# Patient Record
Sex: MALE | Race: WHITE
[De-identification: names, ages, dates, MRNs, and addresses within clinical notes are randomized per-mention and may not be internally consistent; named-entity substitution may affect disease eponyms.]

---

## 2018-12-15 ENCOUNTER — HOSPITAL ENCOUNTER (EMERGENCY)
Dept: HOSPITAL 97 - ER | Age: 23
Discharge: HOME | End: 2018-12-15
Payer: COMMERCIAL

## 2018-12-15 DIAGNOSIS — L02.211: Primary | ICD-10-CM

## 2018-12-15 DIAGNOSIS — F17.210: ICD-10-CM

## 2018-12-15 DIAGNOSIS — Z23: ICD-10-CM

## 2018-12-15 PROCEDURE — 90714 TD VACC NO PRESV 7 YRS+ IM: CPT

## 2018-12-15 PROCEDURE — 0J980ZZ DRAINAGE OF ABDOMEN SUBCUTANEOUS TISSUE AND FASCIA, OPEN APPROACH: ICD-10-PCS

## 2018-12-15 PROCEDURE — 96372 THER/PROPH/DIAG INJ SC/IM: CPT

## 2018-12-15 PROCEDURE — 99283 EMERGENCY DEPT VISIT LOW MDM: CPT

## 2018-12-15 NOTE — EDPHYS
Physician Documentation                                                                           

 [facility]                                                                

Name: Caesar Harvey                                                                                 

Age: 23 yrs                                                                                       

Sex: Male                                                                                         

: 1995                                                                                   

MRN: [id number]                                                                                   

Arrival Date: 12/15/2018                                                                          

Time: 16:57                                                                                       

Account#: [id number]                                                                            

Bed 18                                                                                            

Private MD:                                                                                       

ED Physician Mark Mcdowell                                                                       

HPI:                                                                                              

12/15                                                                                             

17:57 This 23 yrs old  Male presents to ER via Ambulatory with complaints of abscess snw 

      to right lower abdomen.                                                                     

17:57 The patient presents with an abscess of the right lower quadrant, The patient presents  snw 

      with cellulitis of the right lower quadrant, surrounding localized abscess.                 

      Description: The affected area is moderate sized, well demarcated, erythematous,            

      pointed. Onset: The symptoms/episode began/occurred suddenly, and became worse.             

      Possible cause(s): unknown. Associated signs and symptoms: Pertinent positives:             

      erythema. Severity of symptoms: At their worst the symptoms were moderate. The patient      

      has not experienced similar symptoms in the past. The patient has been recently seen by     

      a physician:. Mom gave pt a doxycycline and clindamycin this am.                            

                                                                                                  

Historical:                                                                                       

- Allergies:                                                                                      

17:30 No Known Allergies;                                                                     aj1 

- Home Meds:                                                                                      

17:30 None [Active];                                                                          aj1 

- PMHx:                                                                                           

17:30 None;                                                                                   aj1 

- PSHx:                                                                                           

17:30 None;                                                                                   aj1 

                                                                                                  

- Immunization history:: Flu vaccine is up to date.                                               

- Social history:: Smoking status: Patient uses tobacco products, smokes one pack                 

  cigarettes per day.                                                                             

- Ebola Screening: : Patient denies travel to an Ebola-affected area in the 21 days               

  before illness onset.                                                                           

                                                                                                  

                                                                                                  

ROS:                                                                                              

18:35 Constitutional: Negative for fever, chills, and weight loss, Eyes: Negative for injury, snw 

      pain, redness, and discharge, ENT: Negative for injury, pain, and discharge, Neck:          

      Negative for injury, pain, and swelling, Cardiovascular: Negative for chest pain,           

      palpitations, and edema, Respiratory: Negative for shortness of breath, cough,              

      wheezing, and pleuritic chest pain, Abdomen/GI: Negative for abdominal pain, nausea,        

      vomiting, diarrhea, and constipation, Back: Negative for injury and pain, : Negative      

      for injury, bleeding, discharge, and swelling, MS/Extremity: Negative for injury and        

      deformity, Neuro: Negative for headache, weakness, numbness, tingling, and seizure,         

      Psych: Negative for depression, anxiety, suicide ideation, homicidal ideation, and          

      hallucinations.                                                                             

18:35 Skin: Positive for abscess, cellulitis, of the right lower quadrant.                        

                                                                                                  

Exam:                                                                                             

17:53 Constitutional:  This is a well developed, well nourished patient who is awake, alert,  snw 

      and in no acute distress. Head/Face:  Normocephalic, atraumatic. Eyes:  Pupils equal        

      round and reactive to light, extra-ocular motions intact.  Lids and lashes normal.          

      Conjunctiva and sclera are non-icteric and not injected.  Cornea within normal limits.      

      Periorbital areas with no swelling, redness, or edema. ENT:  Nares patent. No nasal         

      discharge, no septal abnormalities noted.  Tympanic membranes are normal and external       

      auditory canals are clear.  Oropharynx with no redness, swelling, or masses, exudates,      

      or evidence of obstruction, uvula midline.  Mucous membranes moist. Neck:  Trachea          

      midline, no thyromegaly or masses palpated, and no cervical lymphadenopathy.  Supple,       

      full range of motion without nuchal rigidity, or vertebral point tenderness.  No            

      Meningismus. Chest/axilla:  Normal chest wall appearance and motion.  Nontender with no     

      deformity.  No lesions are appreciated. Cardiovascular:  Regular rate and rhythm with a     

      normal S1 and S2.  No gallops, murmurs, or rubs.  Normal PMI, no JVD.  No pulse             

      deficits. Respiratory:  Lungs have equal breath sounds bilaterally, clear to                

      auscultation and percussion.  No rales, rhonchi or wheezes noted.  No increased work of     

      breathing, no retractions or nasal flaring. Abdomen/GI:  Soft, non-tender, with normal      

      bowel sounds.  No distension or tympany.  No guarding or rebound.  No evidence of           

      tenderness throughout. Back:  No spinal tenderness.  No costovertebral tenderness.          

      Full range of motion. MS/ Extremity:  Pulses equal, no cyanosis.  Neurovascular intact.     

       Full, normal range of motion. Neuro:  Awake and alert, GCS 15, oriented to person,         

      place, time, and situation.  Cranial nerves II-XII grossly intact.  Motor strength 5/5      

      in all extremities.  Sensory grossly intact.  Cerebellar exam normal.  Normal gait.         

      Psych:  Awake, alert, with orientation to person, place and time.  Behavior, mood, and      

      affect are within normal limits.                                                            

17:53 Skin: Appearance: normal except for affected area, abscess, that is small, of the right     

      lower quadrant, with pointing, with surrounding cellulitis, that is moderate.               

                                                                                                  

Vital Signs:                                                                                      

17:30  / 83; Pulse 91; Resp 18; Temp 98.3; Pulse Ox 98% on R/A; Weight 88.45 kg (R);    aj1 

      Height 6 ft. 4 in. (193.04 cm) (R); Pain 8/10;                                              

17:30 Body Mass Index 23.74 (88.45 kg, 193.04 cm)                                             aj1 

                                                                                                  

Procedures:                                                                                       

17:53 I \T\ D: Incision and drainage was performed for an abscess of the right lower abdomen    snw

      Prepped with hibiclens. Incised with unroofed pustule with 18 gauge needle. Drained         

      large amount purulent fluid. the patient tolerated the procedure well.                      

18:31 Performed after pain medication administration, firm pressure applied to either side of snw 

      opening and large amt exudate expressed.                                                    

                                                                                                  

MDM:                                                                                              

17:43 Patient medically screened.                                                             snw 

17:53 Data reviewed: vital signs, nurses notes. Data interpreted: Pulse oximetry: on room air snw 

      is 98 %. Interpretation: normal. Counseling: I had a detailed discussion with the           

      patient and/or guardian regarding: the historical points, exam findings, and any            

      diagnostic results supporting the discharge/admit diagnosis, the presence of at least       

      one elevated blood pressure reading (>120/80) during this emergency department visit.       

                                                                                                  

12/15                                                                                             

18:35 Order name: Wound dressing; Complete Time: 18:38                                        snw 

                                                                                                  

Administered Medications:                                                                         

18:21 Drug: Tetanus-Diphtheria Toxoid Adult 0.5 ml {: [employer]. Exp:         em  

      2021. Lot #: [id number]. } Route: IM; Site: right deltoid;                                 

18:38 Follow up: Response: No adverse reaction                                                em  

18:22 Drug: TORadol 60 mg Route: IM; Site: right deltoid;                                     em  

18:38 Follow up: Response: No adverse reaction; Pain is decreased                             em  

18:22 Drug: Norco 5 mg-325 mg 1 tabs Route: PO;                                               em  

18:38 Follow up: Response: No adverse reaction; Pain is decreased                             em  

18:38 Drug: Hibiclens 4 % 1 application Route: Topical; Site: wound;                          em  

18:38 Follow up: Response: No adverse reaction                                                em  

18:45 Drug: Clindamycin 300 mg Route: PO;                                                     em  

18:50 Follow up: Response: Medication administered at discharge.                              em  

                                                                                                  

                                                                                                  

Disposition:                                                                                      

                                                                                             

07:52 Co-signature as Attending Physician, Mark Mcdowell MD.                                    

                                                                                                  

Disposition:                                                                                      

12/15/18 18:33 Discharged to Home. Impression: Cutaneous abscess to right lower abdominal area.   

- Condition is Stable.                                                                            

- Discharge Instructions: Skin Abscess, Cellulitis, Adult, Incision and Drainage, VIS,            

  Tetanus, Diphtheria (Td) - CDC.                                                                 

- Prescriptions for Clindamycin HCl 300 mg Oral Capsule - take 1 capsule by ORAL route            

  every 6 hours for 10 days; 40 capsule. Diclofenac Sodium 75 mg Oral Tablet Sustained            

  Release - take 1 tablet by ORAL route 2 times per day; 30 tablet.                               

- Medication Reconciliation Form, Thank You Letter, Antibiotic Education, Prescription            

  Opioid Use form.                                                                                

- Follow up: Private Physician; When: 2 - 3 days; Reason: Recheck today's complaints,             

  Continuance of care, Re-evaluation by your physician. Follow up: Emergency                      

  Department; When: As needed; Reason: Worsening of condition.                                    

                                                                                                  

                                                                                                  

                                                                                                  

Signatures:                                                                                       

Katiuska Cortes RN                     RN   aj1                                                  

Bruna Snider FNP-C                 FNP-Denyw                                                  

Ghassan Connelly, CLAUDION                       LVN                                                     

Mark Mcdowell MD MD                                                      

                                                                                                  

Corrections: (The following items were deleted from the chart)                                    

12/15                                                                                             

18:51 18:33 12/15/2018 18:33 Discharged to Home. Impression: Cutaneous abscess to right lower em  

      abdominal area. Condition is Stable. Forms are Medication Reconciliation Form, Thank        

      You Letter, Antibiotic Education, Prescription Opioid Use. Follow up: Private               

      Physician; When: 2 - 3 days; Reason: Recheck today's complaints, Continuance of care,       

      Re-evaluation by your physician. Follow up: Emergency Department; When: As needed;          

      Reason: Worsening of condition. snw                                                         

                                                                                                  

**************************************************************************************************

## 2018-12-15 NOTE — ER
Nurse's Notes                                                                                     

 [facility]                                                                

Name: Caesar Harvey                                                                                 

Age: 23 yrs                                                                                       

Sex: Male                                                                                         

: 1995                                                                                   

MRN: [id number]                                                                                   

Arrival Date: 12/15/2018                                                                          

Time: 16:57                                                                                       

Account#: [id number]                                                                            

Bed 18                                                                                            

Private MD:                                                                                       

Diagnosis: Cutaneous abscess to right lower abdominal area                                        

                                                                                                  

Presentation:                                                                                     

12/15                                                                                             

17:28 Presenting complaint: Patient states: Abscess to right lower abdomen. Denies fever,     aj1 

      denies drainage. Patient states that he took clindamycin and doxycycline earlier today.     

      Transition of care: patient was not received from another setting of care. Onset of         

      symptoms was 2018. Risk Assessment: Do you want to hurt yourself or            

      someone else? Patient reports no desire to harm self or others. Initial Sepsis Screen:      

      Does the patient meet any 2 criteria? HR > 90 bpm. No. Patient's initial sepsis screen      

      is negative. Does the patient have a suspected source of infection? Yes: Skin               

      breakdown/wound. Care prior to arrival: None.                                               

17:28 Method Of Arrival: Ambulatory                                                           [facility] 

17:28 Acuity: RASHAD 3                                                                           aj1 

                                                                                                  

Triage Assessment:                                                                                

17:30 General: Appears in no apparent distress. uncomfortable, Behavior is calm, cooperative, aj1 

      appropriate for age. Pain: Complains of pain in right lower quadrant Pain currently is      

      8 out of 10 on a pain scale. Neuro: Level of Consciousness is awake, alert, obeys           

      commands. Cardiovascular: Patient's skin is warm and dry. Respiratory: Airway is patent     

      Respiratory effort is even, unlabored, Respiratory pattern is regular, symmetrical.         

                                                                                                  

Historical:                                                                                       

- Allergies:                                                                                      

17:30 No Known Allergies;                                                                     aj1 

- Home Meds:                                                                                      

17:30 None [Active];                                                                          aj1 

- PMHx:                                                                                           

17:30 None;                                                                                   aj1 

- PSHx:                                                                                           

17:30 None;                                                                                   aj1 

                                                                                                  

- Immunization history:: Flu vaccine is up to date.                                               

- Social history:: Smoking status: Patient uses tobacco products, smokes one pack                 

  cigarettes per day.                                                                             

- Ebola Screening: : Patient denies travel to an Ebola-affected area in the 21 days               

  before illness onset.                                                                           

                                                                                                  

                                                                                                  

Screenin:27 Abuse screen: Denies threats or abuse. Nutritional screening: No deficits noted.        em  

      Tuberculosis screening: No symptoms or risk factors identified. Fall Risk None              

      identified.                                                                                 

                                                                                                  

Assessment:                                                                                       

18:10 General: Appears in no apparent distress. comfortable, Behavior is calm, cooperative,   em  

      Denies fever. Pain: Complains of pain in right lower quadrant Pain currently is 8 out       

      of 10 on a pain scale. Neuro: Level of Consciousness is awake, alert, obeys commands,       

      Oriented to person, place, time, situation. Cardiovascular: Capillary refill < 3            

      seconds Patient's skin is warm and dry. Respiratory: Airway is patent Respiratory           

      effort is even, unlabored, Respiratory pattern is regular, symmetrical. GI: Abdomen is      

      flat, Patient currently denies nausea, vomiting. Derm: Skin is intact, is healthy with      

      good turgor, Skin is pink, warm \T\ dry. Abscess located on right lower quadrant is         

      nickel sized. Musculoskeletal: Range of motion: intact in all extremities.                  

18:15 General: The previous assessment is accurate, call light remains within reach. .        ss  

                                                                                                  

Vital Signs:                                                                                      

17:30  / 83; Pulse 91; Resp 18; Temp 98.3; Pulse Ox 98% on R/A; Weight 88.45 kg (R);    aj1 

      Height 6 ft. 4 in. (193.04 cm) (R); Pain 8/10;                                              

17:30 Body Mass Index 23.74 (88.45 kg, 193.04 cm)                                             aj1 

                                                                                                  

ED Course:                                                                                        

16:57 Patient arrived in ED.                                                                  sb2 

17:30 Triage completed.                                                                       aj1 

17:30 Arm band placed on Patient placed in an exam room.                                      aj1 

17:43 [facility]                                                        snw 

17:43 Mark Mcdowell MD is Attending Physician.                                              snw 

17:58 Ghassan Connelly LVN is Primary Nurse.                                                     em  

18:27 Patient has correct armband on for positive identification. Bed in low position. Call   em  

      light in reach. Adult w/ patient.                                                           

18:50 No provider procedures requiring assistance completed. Patient did not have IV access   em  

      during this emergency room visit.                                                           

                                                                                                  

Administered Medications:                                                                         

18:21 Drug: Tetanus-Diphtheria Toxoid Adult 0.5 ml {: VYou. Exp:         em  

      2021. Lot #: [id number]. } Route: IM; Site: right deltoid;                                 

18:38 Follow up: Response: No adverse reaction                                                em  

18:22 Drug: TORadol 60 mg Route: IM; Site: right deltoid;                                     em  

18:38 Follow up: Response: No adverse reaction; Pain is decreased                             em  

18:22 Drug: Norco 5 mg-325 mg 1 tabs Route: PO;                                               em  

18:38 Follow up: Response: No adverse reaction; Pain is decreased                             em  

18:38 Drug: Hibiclens 4 % 1 application Route: Topical; Site: wound;                          em  

18:38 Follow up: Response: No adverse reaction                                                em  

18:45 Drug: Clindamycin 300 mg Route: PO;                                                     em  

18:50 Follow up: Response: Medication administered at discharge.                              em  

                                                                                                  

                                                                                                  

Outcome:                                                                                          

18:33 Discharge ordered by MD.                                                                snw 

18:50 Discharged to home ambulatory, with family.                                             em  

18:50 Condition: good                                                                             

18:50 Discharge instructions given to patient, Instructed on discharge instructions, follow       

      up and referral plans. medication usage, Demonstrated understanding of instructions,        

      follow-up care, medications, Prescriptions given X 2.                                       

18:51 Patient left the ED.                                                                    em  

                                                                                                  

Signatures:                                                                                       

Katiuska Cortes RN                     RN   aj1                                                  

Bruna Snider, FNP-C                 FNP-Csnw                                                  

Ghassan Connelly, LVN                       LVN  em                                                   

Ingrid Choudhury RN RN                                                      

Kiersten Dennis                               sb2                                                  

                                                                                                  

Corrections: (The following items were deleted from the chart)                                    

17:34 17:28 Presenting complaint: Patient states: Abscess to right lower abdomen. Denies      aj1 

      fever, denies drainage aj1                                                                  

                                                                                                  

**************************************************************************************************